# Patient Record
Sex: MALE | Race: WHITE | NOT HISPANIC OR LATINO | Employment: FULL TIME | URBAN - NONMETROPOLITAN AREA
[De-identification: names, ages, dates, MRNs, and addresses within clinical notes are randomized per-mention and may not be internally consistent; named-entity substitution may affect disease eponyms.]

---

## 2024-07-22 ENCOUNTER — APPOINTMENT (OUTPATIENT)
Dept: AUDIOLOGY | Facility: CLINIC | Age: 54
End: 2024-07-22
Payer: COMMERCIAL

## 2024-07-22 DIAGNOSIS — H90.6 MIXED CONDUCTIVE AND SENSORINEURAL HEARING LOSS OF BOTH EARS: Primary | ICD-10-CM

## 2024-07-22 ASSESSMENT — PAIN - FUNCTIONAL ASSESSMENT: PAIN_FUNCTIONAL_ASSESSMENT: 0-10

## 2024-07-22 ASSESSMENT — PAIN SCALES - GENERAL: PAINLEVEL_OUTOF10: 0 - NO PAIN

## 2024-07-22 NOTE — PROGRESS NOTES
Duran Trejo V-30 P BTEs fit on 2019 (warranty  10-):  Right serial #: [id number]  Left serial #: [id number]    Marlo Shaffer, age 54, is here today with an intermittently functioning left hearing aid.  He does not wear his right hearing aid so I programmed the functioning right hearing aid for his left ear.    Recommendations:  1) Follow up with Dr. Chin for medical clearance for hearing aids (he is going to be eligible for new hearing aids through [facility] 2024 in conjunction with a hearing re-evaluation  2) Consistent hearing aid use

## 2024-09-16 ENCOUNTER — APPOINTMENT (OUTPATIENT)
Dept: AUDIOLOGY | Facility: CLINIC | Age: 54
End: 2024-09-16
Payer: COMMERCIAL

## 2024-09-16 ENCOUNTER — APPOINTMENT (OUTPATIENT)
Dept: OTOLARYNGOLOGY | Facility: CLINIC | Age: 54
End: 2024-09-16
Payer: COMMERCIAL

## 2024-09-16 DIAGNOSIS — H90.A32 MIXED CONDUCTIVE AND SENSORINEURAL HEARING LOSS OF LEFT EAR WITH RESTRICTED HEARING OF RIGHT EAR: Primary | ICD-10-CM

## 2024-09-16 DIAGNOSIS — H91.93 DECREASED HEARING OF BOTH EARS: Primary | ICD-10-CM

## 2024-09-16 DIAGNOSIS — H90.3 ASYMMETRICAL SENSORINEURAL HEARING LOSS: ICD-10-CM

## 2024-09-16 DIAGNOSIS — H93.12 SUBJECTIVE TINNITUS OF LEFT EAR: ICD-10-CM

## 2024-09-16 DIAGNOSIS — H90.A21 SENSORINEURAL HEARING LOSS (SNHL) OF RIGHT EAR WITH RESTRICTED HEARING OF LEFT EAR: ICD-10-CM

## 2024-09-16 PROCEDURE — 92550 TYMPANOMETRY & REFLEX THRESH: CPT | Performed by: AUDIOLOGIST

## 2024-09-16 PROCEDURE — 92557 COMPREHENSIVE HEARING TEST: CPT | Performed by: AUDIOLOGIST

## 2024-09-16 PROCEDURE — 99213 OFFICE O/P EST LOW 20 MIN: CPT | Performed by: OTOLARYNGOLOGY

## 2024-09-16 ASSESSMENT — PAIN - FUNCTIONAL ASSESSMENT: PAIN_FUNCTIONAL_ASSESSMENT: 0-10

## 2024-09-16 ASSESSMENT — PAIN SCALES - GENERAL: PAINLEVEL_OUTOF10: 0 - NO PAIN

## 2024-09-16 NOTE — PROGRESS NOTES
History of Present Illness    09.16.2024: He comes for follow up. He has left SN hearing loss. On examination, TMs intact. No infection. No dizziness.    Recommendation:  1- continue using hearing aid for left ear.    _________________________________________________________________    05.19.2023: No dizziness, uses hearing aids.  He had covid in December 2022.  Shortness of breath while climbing stairs.  Long lasting cough.  He had a PFT, which was concerning for upper airway (laryngeal) pathology.  He is on inhaler (serevent discus - salmeterol)     On today's examination, larynx looks normal. No nodule or polyp was seen.   I think he should follow-up with pulmonary medicine.     ____________________________________________________________________________________________     Mr. Shaffer is a 49 yo. He comes for left endolymphatic hydrops.   Recently he had two episodes of vertigo.  His hearing declined at left, within the past two weeks.  He is on prednisone tab, this is his third day. He is using acetazolamide 250 mg.   He has mild tinnitus at left, but that is chronic.   He watches his diet.     He has allergic rhinitis, his nasal septum is mildly deviated to right.       Review of Systems     Constitutional: no fever.   ENMT: hearing loss~and~tinnitus.      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.02)   · Mixed hearing loss, bilateral (389.22) (H90.6)   · Negative middle ear pressure with type C tympanogram curve (794.15) (R94.120)   · Sudden hearing loss (388.2) (H91.20)   · Tinnitus, left ear (388.30) (H93.12)      Past Medical History     · History of Decreased hearing of left ear (389.9) (H91.92)   · Resolved Date: 07 Nov 2018   · History of nasal congestion (V12.69) (Z87.898)   · Resolved Date: 03 Jun 2019   · History of Sensorineural hearing loss, bilateral (389.18) (H90.3)   · Resolved  Date: 31 Oct 2018   · History of Type C tympanogram of right ear (385.89) (H69.91)   · Resolved Date: 31 Oct 2018   · History of Type C tympanogram of right ear (385.89) (H69.91)   · Resolved Date: 31 Oct 2018     Social History     · Social alcohol use (Z78.9)     Allergies     · No Known Drug Allergies   Recorded By: Mary Kimble; 1/22/2018 9:30:10 AM     Current Meds     Medication Name Instruction   Cetirizine HCl - 10 MG Oral Tablet TAKE 1 TABLET DAILY.   Fluticasone Propionate 50 MCG/ACT Nasal Suspension USE 2 SPRAYS IN EACH NOSTRIL ONCE DAILY   Pantoprazole Sodium 40 MG Oral Tablet Delayed Release take 1 tablet 30 minutes before dinner, while taking steroids      Physical Exam (old exam note)  General appearance: Healthy-appearing, well-nourished, well groomed, in no acute distress.      Head and Face: Atraumatic with no masses, lesions, or scarring.        Facial strength: Normal strength and symmetry, no synkinesis or facial tic.          Eyes: Conjunctivas look non-hyperemic bilaterally       Ears: Bilaterally ear canals look normal. Tympanic membranes look intact, no hyperemia, fluid or retraction. Hearing grossly normal.        Nose: No purulent discharge. (old exam)       Throat: No postnasal discharge. No tonsil hypertrophy. Mild hyperemia. (old exam)        Neck: Symmetrical, trachea midline. (old exam)       Pulmonary: Normal respiratory effort.        Neurological/Psychiatric Orientation to person, place, and time: Normal.  Mood and affect: Normal.        Extremities: No clubbing.        Skin: No skin lesions were noted at face or neck      Procedure    FLEXIBLE LARYNGOSCOPY 05.19.2023  Flexible endoscope was advanced through patient's nasal cavities. There was mild mucoid discharge, no purulent discharge was noted. No lesions were noted at nasopharynx. Base of tongue looked normal. Vocal cords and arytenoids were mobile bilaterally. No granulation, hyperemia, polyp, nodule or mass was seen. No  interarytenoid thickening, no arytenoid edema or erythema.      Diagnoses/Problems    Hearing loss    Tinnitus     Patient Discussion/Summary     09.16.2024: He comes for follow up. He has left SN hearing loss. On examination, TMs intact. No infection. No dizziness.    Recommendation:  1- continue using hearing aid for left ear.    _________________________________________________________________    05.19.2023: No dizziness, uses hearing aids.  He had covid in December 2022.  Shortness of breath while climbing stairs.  Long lasting cough.  He had a PFT, which was concerning for upper airway (laryngeal) pathology.  He is on inhaler (serevent discus - salmeterol)     On today's examination, larynx looks normal. No nodule or polyp was seen.   I think he should follow-up with pulmonary medicine.     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 He recently had two episodes of vertigo. His hearing at left has declined. He is on oral steroids and oral acetazolamide. He had left IT steroid injection today.     His hearing at right decreased sometime between 01.2018 and 10.2018.     Plan:  1- continue low salt, caffeine diet. No alcohol.  2- hearing test,   3- follow up after the test  4- fluticasone spray and cetirizine 10 mg spray for allergic rhinitis

## 2024-09-16 NOTE — PROGRESS NOTES
[employer] Maya V-30 P BTEs fit on 2019 (warranty  10-):  Right serial #: [id number]  Left serial #: [id number]     Marlo Shaffer, age 54 years, is here today for a hearing evaluation. Meniere's diagnosis    Difficulty hearing - yes, both ears (left ear worse) for many years  Tinnitus - yes, left ear for many years  Excessive noise exposure - yes, occupational   Chronic ear infections - no  Ear pain - no  Ear drainage - no  Past ear surgery - no  Vertigo - yes, not currently (last episode several months ago)  Past hearing aid use - yes, (was fit with hearing aids in both ears but only wears the left)  Family history - no    Appointment time: :10-10    Otoscopy revealed clear ear canals with visual inspection of the tympanic membranes bilaterally.    Behavioral hearing evaluation:  Right ear - normal hearing sensitivity to moderate sensorineural hearing loss 125-8000 Hz  Left ear - mild sloping to severe mixed hearing loss 125-8000 Hz    Speech reception thresholds obtained at a level consistent with pure tone thresholds bilaterally.    Word discrimination:  Right ear - excellent (100%)  Left ear - good (88%)    Tympanometry:  Right ear - Type C, negative middle ear pressure with normal ear canal volume and normal eardrum mobility  Left ear - Type A, normal middle ear function    Ipsilateral acoustic reflexes:  Probe right - present 500-4000 Hz  Probe left - present 500-4000 Hz    Contralateral acoustic reflexes:  Probe right - present 500-2000 Hz and absent at 4000 Hz  Probe left - present 500-4000 Hz  The presence of acoustic reflexes within normal intensity limits is consistent with normal middle ear and brainstem function, and suggests that auditory sensitivity is not significantly impaired.     Recommendations:  1) Re-evaluate hearing annually, to monitor, or sooner if a change in hearing is noted  2) Consistent hearing aid use - may qualify for new hearing aids    Hearing Aid Consult:  1) Hearing  aid options were discussed, and we decided upon Phonak Audeo P30 Or P50 Rs  Color: P3 ([place]).  Receivers #1M bilaterally.      2) Prior authorization will be faxed to [facility].     3) Pending approval, hearing aid fitting scheduled for 10-

## 2024-10-22 ENCOUNTER — APPOINTMENT (OUTPATIENT)
Dept: AUDIOLOGY | Facility: CLINIC | Age: 54
End: 2024-10-22
Payer: COMMERCIAL

## 2025-07-18 ENCOUNTER — APPOINTMENT (OUTPATIENT)
Dept: OTOLARYNGOLOGY | Facility: CLINIC | Age: 55
End: 2025-07-18
Payer: COMMERCIAL

## 2025-07-18 DIAGNOSIS — J34.3 NASAL TURBINATE HYPERTROPHY: ICD-10-CM

## 2025-07-18 DIAGNOSIS — K21.9 GASTROESOPHAGEAL REFLUX DISEASE, UNSPECIFIED WHETHER ESOPHAGITIS PRESENT: ICD-10-CM

## 2025-07-18 DIAGNOSIS — J30.9 ALLERGIC RHINITIS, UNSPECIFIED SEASONALITY, UNSPECIFIED TRIGGER: Primary | ICD-10-CM

## 2025-07-18 DIAGNOSIS — J34.2 DEVIATED NASAL SEPTUM: ICD-10-CM

## 2025-07-18 RX ORDER — PANTOPRAZOLE SODIUM 40 MG/1
TABLET, DELAYED RELEASE ORAL
Qty: 90 TABLET | Refills: 1 | Status: SHIPPED | OUTPATIENT
Start: 2025-07-18

## 2025-07-18 NOTE — PROGRESS NOTES
History of Present Illness    07.18.2025. He started to have shortness of breath recently. His voice is little raspy, he clears his throat a lot and produces a lot of phlegm in his throat after he eats. His nose runs after he eats.     On examination, nasal septum is deviated to left. Inferior turbinates are hypertrophic. Head of left middle turbinate is polypoid. [place] flut[place]     Vocal cords and arytenoids look normal. Thick mucoid secretion in between vocal cords. (+)    Recommendations:  1- consider septoplasty turbinoplasty  2- first try fluticasone nasal spray  3- pantoprazole once a day for possible GERD  ______________________________________________________________________    09.16.2024: He comes for follow up. He has left SN hearing loss. On examination, TMs intact. No infection. No dizziness.    Recommendation:  1- continue using hearing aid for left ear.  _________________________________________________________________    05.19.2023: No dizziness, uses hearing aids.  He had covid in December 2022.  Shortness of breath while climbing stairs.  Long lasting cough.  He had a PFT, which was concerning for upper airway (laryngeal) pathology.  He is on inhaler (serevent discus - salmeterol)     On today's examination, larynx looks normal. No nodule or polyp was seen.   I think he should follow-up with pulmonary medicine.     ____________________________________________________________________________________________     Mr. Shaffer is a 47 yo. He comes for left endolymphatic hydrops.   Recently he had two episodes of vertigo.  His hearing declined at left, within the past two weeks.  He is on prednisone tab, this is his third day. He is using acetazolamide 250 mg.   He has mild tinnitus at left, but that is chronic.   He watches his diet.     He has allergic rhinitis, his nasal septum is mildly deviated to right.       Review of Systems     Constitutional: no fever.    ENMT: hearing loss~and~tinnitus.      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.02)   · Mixed hearing loss, bilateral (389.22) (H90.6)   · Negative middle ear pressure with type C tympanogram curve (794.15) (R94.120)   · Sudden hearing loss (388.2) (H91.20)   · Tinnitus, left ear (388.30) (H93.12)      Past Medical History     · History of Decreased hearing of left ear (389.9) (H91.92)   · Resolved Date: 07 Nov 2018   · History of nasal congestion (V12.69) (Z87.898)   · Resolved Date: 03 Jun 2019   · History of Sensorineural hearing loss, bilateral (389.18) (H90.3)   · Resolved Date: 31 Oct 2018   · History of Type C tympanogram of right ear (385.89) (H69.91)   · Resolved Date: 31 Oct 2018   · History of Type C tympanogram of right ear (385.89) (H69.91)   · Resolved Date: 31 Oct 2018     Social History     · Social alcohol use (Z78.9)     Allergies     · No Known Drug Allergies   Recorded By: Mary Kimble; 1/22/2018 9:30:10 AM     Current Meds     Medication Name Instruction   Cetirizine HCl - 10 MG Oral Tablet TAKE 1 TABLET DAILY.   Fluticasone Propionate 50 MCG/ACT Nasal Suspension USE 2 SPRAYS IN EACH NOSTRIL ONCE DAILY   Pantoprazole Sodium 40 MG Oral Tablet Delayed Release take 1 tablet 30 minutes before dinner, while taking steroids      Physical Exam (old exam note)  General appearance: Healthy-appearing, well-nourished, well groomed, in no acute distress.      Head and Face: Atraumatic with no masses, lesions, or scarring.        Facial strength: Normal strength and symmetry, no synkinesis or facial tic.          Eyes: Conjunctivas look non-hyperemic bilaterally       Ears: Bilaterally ear canals look normal. Tympanic membranes look intact, no hyperemia, fluid or retraction. Hearing grossly normal.        Nose: No purulent discharge. (old exam)       Throat: No postnasal discharge. No tonsil  hypertrophy. Mild hyperemia. (old exam)        Neck: Symmetrical, trachea midline. (old exam)       Pulmonary: Normal respiratory effort.        Neurological/Psychiatric Orientation to person, place, and time: Normal.  Mood and affect: Normal.        Extremities: No clubbing.        Skin: No skin lesions were noted at face or neck      Procedure    FLEXIBLE ENDOSCOPY 07/18/2025  Procedure was explained to the patient. After obtaining verbal consent of the patient, 4% lidocaine was sprayed into nasal cavities. Flexible fiberoptic laryngopharyngoscopy was advanced afterwards. Nose: Septum deviated to left Inferior turbinates are hypertrophic. Head of left middle turbinate is polypoid. Nasopharynx: There are no noted lesions in the nasopharynx. The bilateral torus tubari and fossa of Rosenmueller show no lesions and Eustachian tube orifice normal. The mucosal surface of nasopharynx is clear without appreciated masses. The nasopharyngeal surface of the palate has no abnormal lesions. Oropharynx: The tonsillar fossa appears normal bilaterally. The tongue base and vallecula show no lesions. The lingual surface of the epiglottis is normal. The oropharynx is clear of any masses. Larynx: The laryngeal surface of epiglottis, the aryepiglottic folds, the false cords, and the arytenoids are clear of any lesions. The vocal cords have normal mobility and there are no noted lesions. There are no lesions noted in the subglottis. _________________________________________________________________    FLEXIBLE LARYNGOSCOPY 05.19.2023  Flexible endoscope was advanced through patient's nasal cavities. There was mild mucoid discharge, no purulent discharge was noted. No lesions were noted at nasopharynx. Base of tongue looked normal. Vocal cords and arytenoids were mobile bilaterally. No granulation, hyperemia, polyp, nodule or mass was seen. No interarytenoid thickening, no arytenoid edema or erythema.      Diagnoses/Problems    Hearing  loss    Tinnitus     Patient Discussion/Summary     07.18.2025. He started to have shortness of breath recently. His voice is little raspy, he clears his throat a lot and produces a lot of phlegm in his throat after he eats. His nose runs after he eats.     On examination, nasal septum is deviated to left. Inferior turbinates are hypertrophic. Head of left middle turbinate is polypoid. He has been using fluticasone spray [place] in [place].     Vocal cords and arytenoids look normal. Thick mucoid secretion in between vocal cords. (+)    Recommendations:  1- consider septoplasty turbinoplasty  2- first try fluticasone nasal spray  3- pantoprazole once a day for possible GERD  ______________________________________________________________________    09.16.2024: He comes for follow up. He has left SN hearing loss. On examination, TMs intact. No infection. No dizziness.    Recommendation:  1- continue using hearing aid for left ear.    _________________________________________________________________    05.19.2023: No dizziness, uses hearing aids.  He had covid in December 2022.  Shortness of breath while climbing stairs.  Long lasting cough.  He had a PFT, which was concerning for upper airway (laryngeal) pathology.  He is on inhaler (serevent discus - salmeterol)     On today's examination, larynx looks normal. No nodule or polyp was seen.   I think he should follow-up with pulmonary medicine.     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 He recently had two episodes of vertigo. His hearing at left has declined. He is on oral steroids and oral acetazolamide. He had left IT steroid injection today.     His hearing at right decreased sometime between 01.2018 and 10.2018.     Plan:  1- continue low salt, caffeine diet. No alcohol.  2- hearing test,   3- follow up after the test  4-  fluticasone spray and cetirizine 10 mg spray for allergic rhinitis

## 2025-08-08 DIAGNOSIS — K21.9 GASTROESOPHAGEAL REFLUX DISEASE, UNSPECIFIED WHETHER ESOPHAGITIS PRESENT: Primary | ICD-10-CM

## 2025-08-08 RX ORDER — FAMOTIDINE 40 MG/1
TABLET, FILM COATED ORAL
Qty: 90 TABLET | Refills: 1 | Status: SHIPPED | OUTPATIENT
Start: 2025-08-08

## 2025-09-19 ENCOUNTER — APPOINTMENT (OUTPATIENT)
Dept: OTOLARYNGOLOGY | Facility: CLINIC | Age: 55
End: 2025-09-19
Payer: COMMERCIAL